# Patient Record
Sex: FEMALE | Race: WHITE | NOT HISPANIC OR LATINO
[De-identification: names, ages, dates, MRNs, and addresses within clinical notes are randomized per-mention and may not be internally consistent; named-entity substitution may affect disease eponyms.]

---

## 2019-04-21 ENCOUNTER — TRANSCRIPTION ENCOUNTER (OUTPATIENT)
Age: 64
End: 2019-04-21

## 2020-03-05 ENCOUNTER — APPOINTMENT (OUTPATIENT)
Dept: ORTHOPEDIC SURGERY | Facility: CLINIC | Age: 65
End: 2020-03-05
Payer: MEDICARE

## 2020-03-05 DIAGNOSIS — M77.9 ENTHESOPATHY, UNSPECIFIED: ICD-10-CM

## 2020-03-05 DIAGNOSIS — M67.471 GANGLION, RIGHT ANKLE AND FOOT: ICD-10-CM

## 2020-03-05 PROCEDURE — 73630 X-RAY EXAM OF FOOT: CPT | Mod: RT

## 2020-03-05 PROCEDURE — 99203 OFFICE O/P NEW LOW 30 MIN: CPT

## 2020-03-05 PROCEDURE — 73600 X-RAY EXAM OF ANKLE: CPT | Mod: RT

## 2020-03-06 PROBLEM — M67.471 GANGLION CYST OF RIGHT FOOT: Status: ACTIVE | Noted: 2020-03-06

## 2020-03-06 PROBLEM — M77.9 ENTHESOPATHY OF FOOT: Status: ACTIVE | Noted: 2020-03-06

## 2020-03-06 NOTE — PHYSICAL EXAM
[de-identified] : Extremity: +Equinus (releases) R LE, +PPAV R foot, residual weakness with R SLHR testing, mild thickening and associated tenderness R distal Achilles insertional, Bhardwaj testing normal R LE, HV right forefoot.  Nontender R ankle, peroneals, syndesmosis, hindfoot ST, midfoot LF and PTT insertional, and forefoot.  Stable Drawer testing R ankle, 5 / 5 evertor strength R ankle, calves soft, sensorimotor unchanged, skin intact B LE.  AOx3, mood / affect normal. [de-identified] : MRI R ankle (2/13/20 by report): Impression: Achilles tendinopathy and circumferential enlargement from the tibiotalar joint to the insertion. 5x5 mm tear of the anterior fibers at the superior calcaneal process with reactive marrow edema, bursitis, and peritendinitis. 10 mm linear high signal at the posterior insertion with may represent a chronic linear longitudinal tear versus spur with minimal reactive marrow edema. Plain film correlation is suggested. Posterior tibial tendinopathy with tenosynovitis. Sprain anterior talofibular ligament and calcaneofibular ligament. Scarring tarsal sinus ligaments and fat with 10x14 mm ganglion arising from the lateral sinus extending dorsal to the lateral talonavicular joint. \par Radiographs (3v R foot and 2v R ankle) reveals PTS R foot, posterior calcaneal enthesophyte with calcification and plantar calcaneal enthesophyte R hindfoot, os peroneum, HV / HR right forefoot.

## 2020-03-06 NOTE — HISTORY OF PRESENT ILLNESS
[Sneakers] : aguilar [FreeTextEntry1] : 65 year female presents for evaluation of R Achilles tendon x 3 months. Pt denies any recent injuries to R foot/ankle. Pt states the pain is on R Achilles tendon. Pt states the pain is constant and rates at 5-6/10 intensity today. Pt takes Advil/Motrin/Aleve for pain. Pt denies any numbness/tingling. Pt limps with ambulation. Pt had seen podiatrist who ordered MRI and treated her with PT/cam boot. Pt denies any previous injuries/fx to R foot/ankle. Denies additional musculoskeletal complaints referable to foot/ankle. Denies hx of DM, RA/OA or smoking. \par

## 2020-03-06 NOTE — DISCUSSION/SUMMARY
[Surgical risks reviewed] : Surgical risks reviewed [de-identified] : Discussed with patient nature of condition, potential course / sequelae, options reviewed.  Cam boot immobilization and Cam boot ambulation, activity modification, calf stretching exercises and HEP.  Educational handouts provided.  Return to office in 6 - 8 weeks / PRN, all questions answered.

## 2020-04-08 ENCOUNTER — APPOINTMENT (OUTPATIENT)
Dept: ORTHOPEDIC SURGERY | Facility: CLINIC | Age: 65
End: 2020-04-08

## 2020-05-13 ENCOUNTER — APPOINTMENT (OUTPATIENT)
Dept: ORTHOPEDIC SURGERY | Facility: CLINIC | Age: 65
End: 2020-05-13
Payer: MEDICARE

## 2020-05-13 DIAGNOSIS — M62.89 OTHER SPECIFIED DISORDERS OF MUSCLE: ICD-10-CM

## 2020-05-13 PROCEDURE — 99213 OFFICE O/P EST LOW 20 MIN: CPT

## 2020-05-19 ENCOUNTER — APPOINTMENT (OUTPATIENT)
Dept: ORTHOPEDIC SURGERY | Facility: CLINIC | Age: 65
End: 2020-05-19
Payer: MEDICARE

## 2020-05-19 DIAGNOSIS — M21.41 FLAT FOOT [PES PLANUS] (ACQUIRED), RIGHT FOOT: ICD-10-CM

## 2020-05-19 PROCEDURE — 99441: CPT | Mod: 95

## 2020-06-02 ENCOUNTER — APPOINTMENT (OUTPATIENT)
Dept: ORTHOPEDIC SURGERY | Facility: CLINIC | Age: 65
End: 2020-06-02
Payer: MEDICARE

## 2020-06-02 DIAGNOSIS — M67.88 OTHER SPECIFIED DISORDERS OF SYNOVIUM AND TENDON, OTHER SITE: ICD-10-CM

## 2020-06-02 PROCEDURE — 0232T NJX PLATELET PLASMA: CPT

## 2020-06-02 NOTE — DISCUSSION/SUMMARY
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  Patient has been advised that thee can be post-procedural pain as we are trying to recreate a healing response.  Patient has been educated that NSAID medications have anti-platelet effect and that they would hinder the ability of the PRP to produce the wanted to results.  Patient has been advised they must AVOID NSAIDs and may apply ice locally or take Tylenol as needed for pain.  Patient is also advised to rest the area for at least the next 48 hours, this means avoidance of any heavy lifting or excessive physical activity of the involved area.  Patient is advised that if these measures do not provide sufficient pain relief they should call my office at [phone] and we can discuss short-term use of narcotic pain medications for pain relief if needed.  Patient advised it can take up to 1-2 months for the PRP to reach full effect and that it is not guaranteed to provide significant relief and in some rare cases will not provide any relief.  Patient is advised to limit any heavy or repetitive activity with the involved area for at least the next 48 hours and to limit usage with ADL's.  Patient should avoid any gym exercise or physical therapy for the first 2 weeks after today's injection.  Patient has been given a post-procedure rehabilitation protocol which is to be taken to and followed by physical therapy which can be started after 2 weeks.  Patient should follow up in 1-2 months for re-assessment.  Patient appreciates and agrees with current plan.\par \par This note was generated using dragon medical dictation software.  A reasonable effort has been made for proofreading its contents, but typos may still remain.  If there are any questions or points of clarification needed please notify my office.

## 2020-06-02 NOTE — PROCEDURE
[de-identified] : After reviewing risks/benefits/alternatives patient elected to proceed with a peripheral blood draw in order to obtain a platelet rich plasma sample to be injected autologously back to the patient. \par The patient was placed in a position of comfort, and utilizing clean technique a peripheral venous blood sample was obtained from a vein in the antecubital fossa. A 20-gauge needle was utilized to withdraw 60cc of blood from the patient which was collected into a sterile syringe. The peripheral blood sample was immediately transferred via sterile technique to the [employer] closed-loop collection system.  The [employer] centrifuge preparation system was set to run without interruption and a 3cc sample of LR-PRP was collected into a pre-attached sterile syringe.\par \par Ultrasound Guided Injection \par Indication for ultrasound guidance: Ensure placement of PRP within the involved area of the achilles tendon\par Utlizing the [employer] portable ultrasound machine, the Linear 6cm 13-6 MHz transducer, sterile probe cover and sterile ultrasound gel, ultrasound guidance with the probe in the longitudinal axis, utilizing an in-plane approach, was used for the following injection:\par \par Injection: Right ankle \par Indication: Achilles tendinopathy.\par \par A discussion was had with the patient regarding this procedure and all questions were answered. All risks, benefits and alternatives were discussed. These included but were not limited to bleeding, infection, allergic reaction and reaccumulation of fluid. A timeout was done to ensure correct side and pt agreed to the procedure.  Alcohol was used to clean the skin, and betadine was used to sterilize and prep the area in the posterior aspect of the ankle. Ethyl chloride spray was then used as a topical anesthetic. A 25-gauge needle was used to inject 1cc of 1% lidocaine without epinephrine overlying the involved tendinopathy and then a 22-Gauge needle was used to inject the prepared PRP sample into the involved area of tendinopathy. A sterile bandage was then applied. The patient tolerated the procedure well.\par \par PRP: 3cc\par PPP: 28cc\par RBC: 27cc\par

## 2020-06-02 NOTE — PHYSICAL EXAM
[de-identified] : I reviewed and clinically correlated the following outside imaging studies,\par MRI right ankle 2/13/2020 at [facility]\par Impression:\par Achilles tendinopathy and circumferential enlargement from the tibiotalar joint to the insertion.  5 x 5 mm tear of the anterior fibers at the superior calcaneal process with reactive bone marrow edema, bursitis and peritendinitis.  10 mm linear high signal at the posterior insertion which may represent a chronic linear longitudinal tear versus spur with minimal reactive bone marrow edema. [de-identified] : Constitutional: Well-nourished, well-developed, No acute distress\par Respiratory:  Good respiratory effort, no SOB\par Lymphatic: No regional lymphadenopathy, no lymphedema\par Psychiatric: Pleasant and normal affect, alert and oriented x3\par Skin: Clean dry and intact B/L UE/LE\par Musculoskeletal: normal except where as noted in regional exam\par \par Right ankle:\par APPEARANCE: Mild swelling of posterior ankle in midsubstance of achilles tendon, no marked deformities or malalignment\par POSITIVE TENDERNESS: achilles tendon, + thickening of tendon\par NONTENDER: medial malleolus, lateral malleolus, tibialis posterior tendon, ATFL, CFL, PTFL, anterior tibiofibular ligament (high ankle), sinus tarsus, deltoid ligaments, 5th metatarsal. \par RANGE OF MOTION: Mildly limited DF due to stiffness and pain in achilles tendon. \par RESISTIVE TESTING: painless resisted dorsiflexion, plantar flexion, inversion & eversion. \par SPECIAL TESTS: neg Bhardwaj.  neg anterior drawer. neg talar tilt. neg Kleiger's\par NEURO: Normal sensation of LE, DTRs 2+/4 patella and achilles\par PULSES: 2+ DP/PT pulses\par

## 2022-04-05 NOTE — HISTORY OF PRESENT ILLNESS
[de-identified] : Patient has a history of Achilles tendinosis.  We discussed treatment options and patient would like to proceed with PRP injection at this time.  No significant interval change in knee pain since last evaluated. 
(4) rarely moist